# Patient Record
(demographics unavailable — no encounter records)

---

## 2024-11-11 NOTE — PLAN
[FreeTextEntry1] : Will send urine culture  Lifestyle Medicine handouts regarding whole food plant-based diet provided Will f/u pending culture and proceed accordingly

## 2024-11-11 NOTE — HISTORY OF PRESENT ILLNESS
[FreeTextEntry1] : This 36 year old P2  LMP 11/1/24 presents for annual. Monthly cycles, denies any vaginal irritation or change in discharge, just stopped breastfeeding completely within the last couple of months, her 3 yo, no issues with stooling,  aware over the last couple of weeks of a burning sensation when she voids without urgency or hesitancy, trends to " hold my bladder" at night due to co-sleeping, no change to medical/surgical hx, family hx updated. [PapSmeardate] : May 2023 [TextBox_31] : neg pap and HPV